# Patient Record
Sex: MALE | Race: WHITE | NOT HISPANIC OR LATINO | URBAN - METROPOLITAN AREA
[De-identification: names, ages, dates, MRNs, and addresses within clinical notes are randomized per-mention and may not be internally consistent; named-entity substitution may affect disease eponyms.]

---

## 2024-01-09 ENCOUNTER — SOCIAL WORK (OUTPATIENT)
Dept: BEHAVIORAL HEALTH | Facility: CLINIC | Age: 29
End: 2024-01-09
Payer: COMMERCIAL

## 2024-01-09 DIAGNOSIS — F41.1 GAD (GENERALIZED ANXIETY DISORDER): ICD-10-CM

## 2024-01-09 PROCEDURE — 90837 PSYTX W PT 60 MINUTES: CPT | Performed by: SOCIAL WORKER

## 2024-01-09 NOTE — PROGRESS NOTES
Televideo Informed Consent for psychological evaluation was reviewed with the patient as follows:     There are potential benefits and risks of the use of telephone or video-conferencing that differ from in-person sessions. Specifically, the telephone or televideo system we are using may not be HIPPA compliant and may present limits to patient confidentiality. Confidentiality still applies for telepsychology services, and nobody will record the session without your permission.    Understanding and verbal agreement was attested to by the patient. Patient identity was confirmed using 3 sources, including telephone number, email address and date of birth. Provision of services via telehealth was necessitated by the restrictions on face-to-face visits accompanying the COVID-19 pandemic.     SECURE NOTE:  This document may not be released or reproduced in any form without the consent of either the Provider, the Provider´s  or the Chair of the Department of Psychiatry. This prohibition includes copying the document into any non-Restricted area of the Ambulatory Electronic Medical Record.      Start time : 1:02 pm  End time : 2:00 pm    Diagnoses : ROBY, CPTSD    Clients current issues: He reports work is going well. He reports his relationship is good. He is happy with Lupe.    Treatment interventions: We reflected on his anxiety that he had about his relationship with his girlfriend when he broke up with her. He says it was rooted in selfishness. He didn't want to ask anyone for permission to do what he wants. A part that fel threatened that he wasn't going to get what he wants. FA moved away when client was age 10. Client was able to mask a lot.  ([place] thing - people shouldn't move away ) I saw him ( my FA ) leave us and follow someone and she disappeared. He laid down his life for people and it never got reciprocated. I developed the selfishness from that. I put up a shield because I was afraid to  get hurt. --- I have a selfish part   Client connects his break up with his selfish part trying to get what he wants - to live by his home town.     On his way home from his MO's house on Gen[place] Day he got a strong feeling of grief and home sickness. - the exile tried to break through again --- He wants to work on this more in our next session -- a huge feeling of guilt and being torn.   He doesn't want to let people down. Am I not being loyal if I don't live in [place].     I sent client information about IFS parts.    Prognosis: good    Treatment plan update: Maintain current goals.      Continue weekly ongoing psychotherapy.

## 2024-01-30 ENCOUNTER — SOCIAL WORK (OUTPATIENT)
Dept: BEHAVIORAL HEALTH | Facility: CLINIC | Age: 29
End: 2024-01-30
Payer: COMMERCIAL

## 2024-01-30 DIAGNOSIS — F41.1 GAD (GENERALIZED ANXIETY DISORDER): ICD-10-CM

## 2024-01-30 PROCEDURE — 90837 PSYTX W PT 60 MINUTES: CPT | Performed by: SOCIAL WORKER

## 2024-01-30 NOTE — PROGRESS NOTES
Televideo Informed Consent for psychological evaluation was reviewed with the patient as follows:     There are potential benefits and risks of the use of telephone or video-conferencing that differ from in-person sessions. Specifically, the telephone or televideo system we are using may not be HIPPA compliant and may present limits to patient confidentiality. Confidentiality still applies for telepsychology services, and nobody will record the session without your permission.    Understanding and verbal agreement was attested to by the patient. Patient identity was confirmed using 3 sources, including telephone number, email address and date of birth. Provision of services via telehealth was necessitated by the restrictions on face-to-face visits accompanying the COVID-19 pandemic.     SECURE NOTE:  This document may not be released or reproduced in any form without the consent of either the Provider, the Provider´s  or the Chair of the Department of Psychiatry. This prohibition includes copying the document into any non-Restricted area of the Ambulatory Electronic Medical Record.      Start time : 1:04 pm  End time : 1:59 pm    Diagnoses : ROBY    Clients current issues: Client has a strong part who wants him to stay connected with [place].     Treatment interventions: IFS - connection with this part  Legacy burden   Heavy heart with a guilty feeling  Its job is to keep him connected to people and his dream childhood.  It afraid I will be all alone with nothing and it will say you will have nothing anymore. ( Fears social isolation) - its my safe zone   -- My GM her friends and family .   [place] --- when I went to [employer] I was all alone - panic and homesick   We ask it if it wants to unburden - it fights back   ( A lot of these feelings came up when I started dating Lupe - I feared she wouldn't want the house or to love in [place]  - she threatened this part )   ( [place] is now also a safe zone  )  We get permission to go to the exile.  20 year old who is sad - he wasn't heard or seen - it knew it didn't want to go far - it went far away and it was so homesick - it had no support - it holds the pain of Lupe and when she moved to [place]   He wants to feel validated. All his peers questioned him and made him feel understood. Client validates the 20 year old.   He wants validation - it was awful  The part says it needs validation from others - I do direct access  The 20 year old says he needs to open up about all this to Lupe and to his MO.  The 28 year old agrees.   The 20 year old say I need to be socially connected and loved.   He agrees to that.     Prognosis: good    Treatment plan update: Decrease anxiety.      Continue weekly ongoing psychotherapy.

## 2024-02-15 ENCOUNTER — SOCIAL WORK (OUTPATIENT)
Dept: BEHAVIORAL HEALTH | Facility: CLINIC | Age: 29
End: 2024-02-15
Payer: COMMERCIAL

## 2024-02-15 DIAGNOSIS — F41.1 GAD (GENERALIZED ANXIETY DISORDER): ICD-10-CM

## 2024-02-15 PROCEDURE — 90837 PSYTX W PT 60 MINUTES: CPT | Performed by: SOCIAL WORKER

## 2024-02-15 NOTE — PROGRESS NOTES
Televideo Informed Consent for psychological evaluation was reviewed with the patient as follows:     There are potential benefits and risks of the use of telephone or video-conferencing that differ from in-person sessions. Specifically, the telephone or televideo system we are using may not be HIPPA compliant and may present limits to patient confidentiality. Confidentiality still applies for telepsychology services, and nobody will record the session without your permission.    Understanding and verbal agreement was attested to by the patient. Patient identity was confirmed using 3 sources, including telephone number, email address and date of birth. Provision of services via telehealth was necessitated by the restrictions on face-to-face visits accompanying the COVID-19 pandemic.     SECURE NOTE:  This document may not be released or reproduced in any form without the consent of either the Provider, the Provider´s  or the Chair of the Department of Psychiatry. This prohibition includes copying the document into any non-Restricted area of the Ambulatory Electronic Medical Record.      Start time : 11:04 am  End time : 12:02 pm    Diagnoses : ROBY    Clients current issues: He is experiencing a major depression. He was sick early in the month - 10 days - He was on antivirals. He feels foggy. He feels no cristina.  Cabin fever is possibly a part of it   He misses being social. He had a plan to have a fun week off but he was sick.     Treatment interventions: CBT, IFS  It is in his head   Its about work - its getting ready for the next chapter of life  I'm always working and not having enough fun.   I don't want to [place].   Theres not the cristina in the job anymore.   Your job was everything. 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   God what if you were just normal.  ( 6 years )   Parts fear - I would feel uncomfortable and I would lose my freedom   The part wants a  more regular schedule.  The part was jealous of the gilr that quit her job.   The part wants me to think long term.  This part wants me to be happy with friends and family.  When your dream job is not your dream job   This part fears giving up the dream and the identity.   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    Prognosis: good    Treatment plan update: Maintain current goals.      Continue weekly ongoing psychotherapy.

## 2024-04-01 ENCOUNTER — APPOINTMENT (OUTPATIENT)
Dept: BEHAVIORAL HEALTH | Facility: CLINIC | Age: 29
End: 2024-04-01
Payer: COMMERCIAL

## 2024-04-29 ENCOUNTER — APPOINTMENT (OUTPATIENT)
Dept: BEHAVIORAL HEALTH | Facility: CLINIC | Age: 29
End: 2024-04-29
Payer: COMMERCIAL

## 2024-05-16 ENCOUNTER — SOCIAL WORK (OUTPATIENT)
Dept: BEHAVIORAL HEALTH | Facility: CLINIC | Age: 29
End: 2024-05-16
Payer: COMMERCIAL

## 2024-05-16 PROCEDURE — 90837 PSYTX W PT 60 MINUTES: CPT | Performed by: SOCIAL WORKER

## 2024-05-16 NOTE — PROGRESS NOTES
"Televideo Informed Consent for psychological evaluation was reviewed with the patient as follows:     There are potential benefits and risks of the use of telephone or video-conferencing that differ from in-person sessions. Specifically, the telephone or televideo system we are using may not be HIPPA compliant and may present limits to patient confidentiality. Confidentiality still applies for telepsychology services, and nobody will record the session without your permission.    Understanding and verbal agreement was attested to by the patient. Patient identity was confirmed using 3 sources, including telephone number, email address and date of birth. Provision of services via telehealth was necessitated by the restrictions on face-to-face visits accompanying the COVID-19 pandemic.     SECURE NOTE:  This document may not be released or reproduced in any form without the consent of either the Provider, the Provider´s  or the Chair of the Department of Psychiatry. This prohibition includes copying the document into any non-Restricted area of the Ambulatory Electronic Medical Record.      Start time : 4:02 pm  End time : 5:00 pm    Diagnoses : ROBY    Clients current issues: His girlfriend is moving into [facility] or [employer] and she is changing jobs.   He had a productive meeting with his supervisor. It refocused him on his work in a positive way. He wants to be promoted to management.   He still loves working in the sports industry.  He is still with Lupe.  \"He wants to be a soccer Dad.    Treatment interventions: CBT  Supported him in operating in [place].   Discussed how to make mature adult decisions.   Discussed perspective taking.     Prognosis:  good    Treatment plan update: Client has made good progress.     Continue weekly ongoing psychotherapy.   "

## 2024-06-04 ENCOUNTER — SOCIAL WORK (OUTPATIENT)
Dept: BEHAVIORAL HEALTH | Facility: CLINIC | Age: 29
End: 2024-06-04
Payer: COMMERCIAL

## 2024-06-04 DIAGNOSIS — F41.1 GAD (GENERALIZED ANXIETY DISORDER): ICD-10-CM

## 2024-06-04 PROCEDURE — 90837 PSYTX W PT 60 MINUTES: CPT | Performed by: SOCIAL WORKER

## 2024-06-04 NOTE — PROGRESS NOTES
"Televideo Informed Consent for psychological evaluation was reviewed with the patient as follows:     There are potential benefits and risks of the use of telephone or video-conferencing that differ from in-person sessions. Specifically, the telephone or televideo system we are using may not be HIPPA compliant and may present limits to patient confidentiality. Confidentiality still applies for telepsychology services, and nobody will record the session without your permission.    Understanding and verbal agreement was attested to by the patient. Patient identity was confirmed using 3 sources, including telephone number, email address and date of birth. Provision of services via telehealth was necessitated by the restrictions on face-to-face visits accompanying the COVID-19 pandemic.     SECURE NOTE:  This document may not be released or reproduced in any form without the consent of either the Provider, the Provider´s  or the Chair of the Department of Psychiatry. This prohibition includes copying the document into any non-Restricted area of the Ambulatory Electronic Medical Record.      Start time : 2:05 pm  End time :  3:00 pm    Diagnoses : ROBY     Clients current issues: Client shared a memory he has of his FA moving to [place] and how this impacted him.     Treatment interventions: IFS  FA broke his trust when he moved away  It doesn't make sense to me  FA followed women he loved - he kept getting screwed over   It has made me protective of what I love/want  FA unselfish/ codependent / me selfish   Part - worry -  feels like something is out to get me   Part - that struggles with trusting anything different - don't rock the boat   Are we not worth it - to move back to be with us - who are you choosing  Part that believes we don't matter   ---- shame   He wants to be a suburban Dad  \"Its okay to be a homebody.\"  He wants to be near [place]. He gets a sense of belonging there.     Prognosis: " good    Treatment plan update: Client is working on his goals.      Continue weekly ongoing psychotherapy.

## 2024-07-09 ENCOUNTER — APPOINTMENT (OUTPATIENT)
Dept: BEHAVIORAL HEALTH | Facility: CLINIC | Age: 29
End: 2024-07-09
Payer: COMMERCIAL

## 2024-07-09 DIAGNOSIS — F41.1 GAD (GENERALIZED ANXIETY DISORDER): ICD-10-CM

## 2024-07-09 PROCEDURE — 90837 PSYTX W PT 60 MINUTES: CPT | Performed by: SOCIAL WORKER

## 2024-07-09 NOTE — PROGRESS NOTES
"Televideo Informed Consent for psychological evaluation was reviewed with the patient as follows:     There are potential benefits and risks of the use of telephone or video-conferencing that differ from in-person sessions. Specifically, the telephone or televideo system we are using may not be HIPPA compliant and may present limits to patient confidentiality. Confidentiality still applies for telepsychology services, and nobody will record the session without your permission.    Understanding and verbal agreement was attested to by the patient. Patient identity was confirmed using 3 sources, including telephone number, email address and date of birth.      SECURE NOTE:  This document may not be released or reproduced in any form without the consent of either the Provider, the Provider´s  or the Chair of the Department of Psychiatry. This prohibition includes copying the document into any non-Restricted area of the Ambulatory Electronic Medical Record.      Start time : 4:02 pm  End time : 4:55 pm    Diagnoses : ROBY    Clients current issues: He is getting ready for a change at work. He is confused about work. Is this it? He has been exploring other jobs. He did not get the promotion at work which started his thinking about other options. He works most weekends. 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 He doesn't want this in his future. \" He has a fear of the break up.\"    Treatment interventions: IFS  Supported his exploration of other employment.   He sees Lupe as the next chapter.   Addressed clients fears.     Prognosis: good    Treatment plan update: Client is invested in therapy.      Continue weekly ongoing psychotherapy.   "

## 2024-08-22 ENCOUNTER — APPOINTMENT (OUTPATIENT)
Dept: BEHAVIORAL HEALTH | Facility: CLINIC | Age: 29
End: 2024-08-22
Payer: COMMERCIAL

## 2024-08-22 DIAGNOSIS — F41.1 GAD (GENERALIZED ANXIETY DISORDER): ICD-10-CM

## 2024-08-22 PROCEDURE — 90837 PSYTX W PT 60 MINUTES: CPT | Performed by: SOCIAL WORKER
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
## 2024-09-19 ENCOUNTER — APPOINTMENT (OUTPATIENT)
Dept: BEHAVIORAL HEALTH | Facility: CLINIC | Age: 29
End: 2024-09-19
Payer: COMMERCIAL

## 2024-09-19 DIAGNOSIS — F41.1 GAD (GENERALIZED ANXIETY DISORDER): ICD-10-CM

## 2024-09-19 PROCEDURE — 90837 PSYTX W PT 60 MINUTES: CPT | Performed by: SOCIAL WORKER

## 2024-09-19 NOTE — PROGRESS NOTES
"Televideo Informed Consent for psychological evaluation was reviewed with the patient as follows:     There are potential benefits and risks of the use of telephone or video-conferencing that differ from in-person sessions. Specifically, the telephone or televideo system we are using may not be HIPPA compliant and may present limits to patient confidentiality. Confidentiality still applies for telepsychology services, and nobody will record the session without your permission.    Understanding and verbal agreement was attested to by the patient. Patient identity was confirmed using 3 sources, including telephone number, email address and date of birth. Provision of services via telehealth was necessitated by the restrictions on face-to-face visits accompanying the COVID-19 pandemic.     SECURE NOTE:  This document may not be released or reproduced in any form without the consent of either the Provider, the Provider´s  or the Chair of the Department of Psychiatry. This prohibition includes copying the document into any non-Restricted area of the Ambulatory Electronic Medical Record.      Start time : 2:02 pm  End time : 2:59 pm    Diagnoses : ROBY    Clients current issues: He had an interview at a new job but he doesn't want it. He wants a 9-5 and this job he interviewed for does not have this. \" I want to be the Suburban Dad.\"  He is getting more involved with the [employer] and less with the [employer].  He recognizes he was in a slump/depressed for a while - some dread.     Treatment interventions: IFS Explored his parts related to this job opportunity.   Explored his recent depression and its causes. - expectations for his career that he thought he wasn't meeting  Learning to accept life as it is.     Prognosis: good    Treatment plan update: Client is invested in the therapy process.      Continue weekly ongoing psychotherapy.   " none

## 2024-10-02 ENCOUNTER — APPOINTMENT (OUTPATIENT)
Dept: PRIMARY CARE | Facility: CLINIC | Age: 29
End: 2024-10-02
Payer: COMMERCIAL

## 2024-10-02 VITALS
OXYGEN SATURATION: 98 % | HEART RATE: 88 BPM | DIASTOLIC BLOOD PRESSURE: 68 MMHG | SYSTOLIC BLOOD PRESSURE: 122 MMHG | WEIGHT: 195 LBS | RESPIRATION RATE: 14 BRPM | HEIGHT: 73 IN | TEMPERATURE: 97.7 F | BODY MASS INDEX: 25.84 KG/M2

## 2024-10-02 DIAGNOSIS — Z11.4 ENCOUNTER FOR SCREENING FOR HIV: ICD-10-CM

## 2024-10-02 DIAGNOSIS — Z23 IMMUNIZATION DUE: ICD-10-CM

## 2024-10-02 DIAGNOSIS — T24.131A SUPERFICIAL BURN OF RIGHT LOWER LEG, INITIAL ENCOUNTER: ICD-10-CM

## 2024-10-02 DIAGNOSIS — Z00.00 WELL ADULT HEALTH CHECK: Primary | ICD-10-CM

## 2024-10-02 DIAGNOSIS — B00.9 HERPES SIMPLEX: ICD-10-CM

## 2024-10-02 PROBLEM — F41.1 GAD (GENERALIZED ANXIETY DISORDER): Status: RESOLVED | Noted: 2024-06-04 | Resolved: 2024-10-02

## 2024-10-02 PROBLEM — L05.01 PILONIDAL CYST WITH ABSCESS: Status: RESOLVED | Noted: 2017-09-25 | Resolved: 2024-10-02

## 2024-10-02 PROBLEM — L05.01 PILONIDAL CYST WITH ABSCESS: Status: ACTIVE | Noted: 2017-09-25

## 2024-10-02 PROCEDURE — 3008F BODY MASS INDEX DOCD: CPT | Performed by: INTERNAL MEDICINE

## 2024-10-02 PROCEDURE — 1036F TOBACCO NON-USER: CPT | Performed by: INTERNAL MEDICINE

## 2024-10-02 PROCEDURE — 90656 IIV3 VACC NO PRSV 0.5 ML IM: CPT | Performed by: INTERNAL MEDICINE

## 2024-10-02 PROCEDURE — 90471 IMMUNIZATION ADMIN: CPT | Performed by: INTERNAL MEDICINE

## 2024-10-02 PROCEDURE — 99385 PREV VISIT NEW AGE 18-39: CPT | Performed by: INTERNAL MEDICINE

## 2024-10-02 PROCEDURE — 90715 TDAP VACCINE 7 YRS/> IM: CPT | Performed by: INTERNAL MEDICINE

## 2024-10-02 PROCEDURE — 90472 IMMUNIZATION ADMIN EACH ADD: CPT | Performed by: INTERNAL MEDICINE

## 2024-10-02 ASSESSMENT — ENCOUNTER SYMPTOMS
POLYDIPSIA: 0
FEVER: 0
NECK PAIN: 0
VOMITING: 0
DIZZINESS: 0
DIARRHEA: 0
COUGH: 0
ARTHRALGIAS: 0
DYSPHORIC MOOD: 0
HEADACHES: 0
PALPITATIONS: 0
NAUSEA: 0
CHEST TIGHTNESS: 0
CONSTIPATION: 0
FREQUENCY: 0
SHORTNESS OF BREATH: 0
UNEXPECTED WEIGHT CHANGE: 0

## 2024-10-02 ASSESSMENT — PATIENT HEALTH QUESTIONNAIRE - PHQ9
2. FEELING DOWN, DEPRESSED OR HOPELESS: NOT AT ALL
1. LITTLE INTEREST OR PLEASURE IN DOING THINGS: NOT AT ALL
SUM OF ALL RESPONSES TO PHQ9 QUESTIONS 1 AND 2: 0
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
## 2024-10-24 ENCOUNTER — APPOINTMENT (OUTPATIENT)
Dept: BEHAVIORAL HEALTH | Facility: CLINIC | Age: 29
End: 2024-10-24
Payer: COMMERCIAL

## 2024-10-24 DIAGNOSIS — F41.1 GAD (GENERALIZED ANXIETY DISORDER): ICD-10-CM

## 2024-10-24 PROCEDURE — 90837 PSYTX W PT 60 MINUTES: CPT | Performed by: SOCIAL WORKER

## 2024-10-24 NOTE — PROGRESS NOTES
Televideo Informed Consent for psychological evaluation was reviewed with the patient as follows:     There are potential benefits and risks of the use of telephone or video-conferencing that differ from in-person sessions. Specifically, the telephone or televideo system we are using may not be HIPPA compliant and may present limits to patient confidentiality. Confidentiality still applies for telepsychology services, and nobody will record the session without your permission.    Understanding and verbal agreement was attested to by the patient. Patient identity was confirmed using 3 sources, including telephone number, email address and date of birth. Provision of services via telehealth was necessitated by the restrictions on face-to-face visits accompanying the COVID-19 pandemic.     SECURE NOTE:  This document may not be released or reproduced in any form without the consent of either the Provider, the Provider´s  or the Chair of the Department of Psychiatry. This prohibition includes copying the document into any non-Restricted area of the Ambulatory Electronic Medical Record.      Start time : 2:02 pm  End time : 2:58 pm    Diagnoses : ROBY    Clients current issues: He feels like something is wrong inside himself.   He is losing enjoyment in life. He questions whether he has depression.     Treatment interventions: IFS, Explored this part. Is this my body saying I need something different. Am I on the right path to living the life I want to live? This part doesn't know what is next. I don't have a challenge. He feels stagnant.   He likes to work hard play hard but he is not getting this. -- not enough play  ? 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 Leaving sports   If the part stopped - I could feel regret    ---------- This part may want to work in his home town again.  This part wants him to find more purpose.   Could I work in college life?    Supported more exploring what is fun and what could be a challenging adventurous career.    Prognosis: good    Treatment plan update: Client is doing well.      Continue weekly ongoing psychotherapy.

## 2024-11-21 ENCOUNTER — APPOINTMENT (OUTPATIENT)
Dept: BEHAVIORAL HEALTH | Facility: CLINIC | Age: 29
End: 2024-11-21
Payer: COMMERCIAL

## 2024-11-21 DIAGNOSIS — F41.1 GAD (GENERALIZED ANXIETY DISORDER): ICD-10-CM

## 2024-11-21 PROCEDURE — 90837 PSYTX W PT 60 MINUTES: CPT | Performed by: SOCIAL WORKER

## 2024-11-21 NOTE — PROGRESS NOTES
Televideo Informed Consent for psychological evaluation was reviewed with the patient as follows:     There are potential benefits and risks of the use of telephone or video-conferencing that differ from in-person sessions. Specifically, the telephone or televideo system we are using may not be HIPPA compliant and may present limits to patient confidentiality. Confidentiality still applies for telepsychology services, and nobody will record the session without your permission.    Understanding and verbal agreement was attested to by the patient. Patient identity was confirmed using 3 sources, including telephone number, email address and date of birth. Provision of services via telehealth was necessitated by the restrictions on face-to-face visits accompanying the COVID-19 pandemic.     SECURE NOTE:  This document may not be released or reproduced in any form without the consent of either the Provider, the Provider´s  or the Chair of the Department of Psychiatry. This prohibition includes copying the document into any non-Restricted area of the Ambulatory Electronic Medical Record.      Start time : 4:00 pm  End time : 5:00 pm    Diagnoses : ROBY    Clients current issues: Client is happier. He is getting more time off.   He is considering his future with Lupe more. They are discussing these things and compromising.  -- will they have kids  -- will they live in [place]  Lupe is moving in with him in the summer.    Treatment interventions:  Supported clients self evaluation and mature conversations with his girlfriend.  Discussed how he pulled himself out of depression with reflection, conversation and perspective.   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    Prognosis: good    Treatment plan update: Client is fully invested in therapy.      Continue weekly ongoing psychotherapy.

## 2024-12-30 ENCOUNTER — APPOINTMENT (OUTPATIENT)
Dept: BEHAVIORAL HEALTH | Facility: CLINIC | Age: 29
End: 2024-12-30
Payer: COMMERCIAL

## 2025-01-09 ENCOUNTER — APPOINTMENT (OUTPATIENT)
Dept: BEHAVIORAL HEALTH | Facility: CLINIC | Age: 30
End: 2025-01-09
Payer: COMMERCIAL

## 2025-01-09 DIAGNOSIS — F41.1 GAD (GENERALIZED ANXIETY DISORDER): ICD-10-CM

## 2025-01-09 PROCEDURE — 90837 PSYTX W PT 60 MINUTES: CPT | Performed by: SOCIAL WORKER

## 2025-01-09 NOTE — PROGRESS NOTES
Televideo Informed Consent for psychological evaluation was reviewed with the patient as follows:     There are potential benefits and risks of the use of telephone or video-conferencing that differ from in-person sessions. Specifically, the telephone or televideo system we are using may not be HIPPA compliant and may present limits to patient confidentiality. Confidentiality still applies for telepsychology services, and nobody will record the session without your permission.    Understanding and verbal agreement was attested to by the patient. Patient identity was confirmed using 3 sources, including telephone number, email address and date of birth. Provision of services via telehealth was necessitated by the restrictions on face-to-face visits accompanying the COVID-19 pandemic.     SECURE NOTE:  This document may not be released or reproduced in any form without the consent of either the Provider, the Provider´s  or the Chair of the Department of Psychiatry. This prohibition includes copying the document into any non-Restricted area of the Ambulatory Electronic Medical Record.      Start time : 1:03 pm  End time : 2:00 pm    Diagnoses : ROBY    Clients current issues: He had some anxiety and depression over the holidays.   Family , hometown, relationship, place of living job - He has rigid thoughts about these things that he wants to explore.   Lupe doesn't want a child. He is open to not having a child.   He stopped drinking and being on social media.  He says he has some baked in beliefs.   He is rethinking living in [place] in the future.    Treatment interventions: Discussed how he has had long held strong beliefs. Discussed his ability to be mature in his ability to compromise.   Discussed his rigid thinking and how to be more open. We worked on an openness to change.    Prognosis: good    Treatment plan update: Client is invested in therapy.     Continue weekly ongoing psychotherapy.

## 2025-01-23 ENCOUNTER — LAB (OUTPATIENT)
Dept: LAB | Facility: LAB | Age: 30
End: 2025-01-23
Payer: COMMERCIAL

## 2025-01-23 DIAGNOSIS — Z11.4 ENCOUNTER FOR SCREENING FOR HIV: ICD-10-CM

## 2025-01-23 DIAGNOSIS — B00.9 HERPES SIMPLEX: ICD-10-CM

## 2025-01-23 DIAGNOSIS — Z00.00 WELL ADULT HEALTH CHECK: ICD-10-CM

## 2025-01-23 LAB
ERYTHROCYTE [DISTWIDTH] IN BLOOD BY AUTOMATED COUNT: 11.9 % (ref 11.5–14.5)
HCT VFR BLD AUTO: 47.9 % (ref 41–52)
HGB BLD-MCNC: 16.6 G/DL (ref 13.5–17.5)
HIV 1+2 AB+HIV1 P24 AG SERPL QL IA: NONREACTIVE
MCH RBC QN AUTO: 28.7 PG (ref 26–34)
MCHC RBC AUTO-ENTMCNC: 34.7 G/DL (ref 32–36)
MCV RBC AUTO: 83 FL (ref 80–100)
NRBC BLD-RTO: 0 /100 WBCS (ref 0–0)
PLATELET # BLD AUTO: 251 X10*3/UL (ref 150–450)
RBC # BLD AUTO: 5.78 X10*6/UL (ref 4.5–5.9)
WBC # BLD AUTO: 5 X10*3/UL (ref 4.4–11.3)

## 2025-01-23 PROCEDURE — 84443 ASSAY THYROID STIM HORMONE: CPT

## 2025-01-23 PROCEDURE — 86695 HERPES SIMPLEX TYPE 1 TEST: CPT

## 2025-01-23 PROCEDURE — 84439 ASSAY OF FREE THYROXINE: CPT

## 2025-01-23 PROCEDURE — 86696 HERPES SIMPLEX TYPE 2 TEST: CPT

## 2025-01-23 PROCEDURE — 80053 COMPREHEN METABOLIC PANEL: CPT

## 2025-01-23 PROCEDURE — 80061 LIPID PANEL: CPT

## 2025-01-23 PROCEDURE — 87389 HIV-1 AG W/HIV-1&-2 AB AG IA: CPT

## 2025-01-23 PROCEDURE — 85027 COMPLETE CBC AUTOMATED: CPT

## 2025-01-24 LAB
ALBUMIN SERPL BCP-MCNC: 5.1 G/DL (ref 3.4–5)
ALP SERPL-CCNC: 55 U/L (ref 33–120)
ALT SERPL W P-5'-P-CCNC: 18 U/L (ref 10–52)
ANION GAP SERPL CALC-SCNC: 11 MMOL/L (ref 10–20)
AST SERPL W P-5'-P-CCNC: 17 U/L (ref 9–39)
BILIRUB SERPL-MCNC: 0.9 MG/DL (ref 0–1.2)
BUN SERPL-MCNC: 20 MG/DL (ref 6–23)
CALCIUM SERPL-MCNC: 10.1 MG/DL (ref 8.6–10.6)
CHLORIDE SERPL-SCNC: 102 MMOL/L (ref 98–107)
CHOLEST SERPL-MCNC: 189 MG/DL (ref 0–199)
CHOLESTEROL/HDL RATIO: 4.7
CO2 SERPL-SCNC: 31 MMOL/L (ref 21–32)
CREAT SERPL-MCNC: 1.28 MG/DL (ref 0.5–1.3)
EGFRCR SERPLBLD CKD-EPI 2021: 79 ML/MIN/1.73M*2
GLUCOSE SERPL-MCNC: 80 MG/DL (ref 74–99)
HDLC SERPL-MCNC: 40.4 MG/DL
HERPES SIMPLEX VIRUS 1 IGG: >8 INDEX
HERPES SIMPLEX VIRUS 2 IGG: <0.2 INDEX
LDLC SERPL CALC-MCNC: 132 MG/DL
NON HDL CHOLESTEROL: 149 MG/DL (ref 0–149)
POTASSIUM SERPL-SCNC: 4.3 MMOL/L (ref 3.5–5.3)
PROT SERPL-MCNC: 7.1 G/DL (ref 6.4–8.2)
SODIUM SERPL-SCNC: 140 MMOL/L (ref 136–145)
T4 FREE SERPL-MCNC: 1.41 NG/DL (ref 0.78–1.48)
TRIGL SERPL-MCNC: 83 MG/DL (ref 0–149)
TSH SERPL-ACNC: 19.65 MIU/L (ref 0.44–3.98)
VLDL: 17 MG/DL (ref 0–40)

## 2025-01-27 ENCOUNTER — OFFICE VISIT (OUTPATIENT)
Dept: PRIMARY CARE | Facility: CLINIC | Age: 30
End: 2025-01-27
Payer: COMMERCIAL

## 2025-01-27 ENCOUNTER — APPOINTMENT (OUTPATIENT)
Dept: BEHAVIORAL HEALTH | Facility: CLINIC | Age: 30
End: 2025-01-27
Payer: COMMERCIAL

## 2025-01-27 VITALS
TEMPERATURE: 98.6 F | OXYGEN SATURATION: 97 % | DIASTOLIC BLOOD PRESSURE: 72 MMHG | WEIGHT: 190.6 LBS | HEIGHT: 73 IN | SYSTOLIC BLOOD PRESSURE: 112 MMHG | RESPIRATION RATE: 16 BRPM | HEART RATE: 60 BPM | BODY MASS INDEX: 25.26 KG/M2

## 2025-01-27 DIAGNOSIS — R94.6 ABNORMAL THYROID FUNCTION TEST: Primary | ICD-10-CM

## 2025-01-27 DIAGNOSIS — F41.1 GAD (GENERALIZED ANXIETY DISORDER): ICD-10-CM

## 2025-01-27 PROCEDURE — 99213 OFFICE O/P EST LOW 20 MIN: CPT | Performed by: INTERNAL MEDICINE

## 2025-01-27 PROCEDURE — 1036F TOBACCO NON-USER: CPT | Performed by: INTERNAL MEDICINE

## 2025-01-27 PROCEDURE — 3008F BODY MASS INDEX DOCD: CPT | Performed by: INTERNAL MEDICINE

## 2025-01-27 PROCEDURE — 90837 PSYTX W PT 60 MINUTES: CPT | Performed by: SOCIAL WORKER

## 2025-01-27 ASSESSMENT — ENCOUNTER SYMPTOMS
WEIGHT LOSS: 1
FATIGUE: 1
DRY SKIN: 1
DEPRESSED MOOD: 1
HAIR LOSS: 1

## 2025-01-27 ASSESSMENT — PATIENT HEALTH QUESTIONNAIRE - PHQ9
SUM OF ALL RESPONSES TO PHQ9 QUESTIONS 1 AND 2: 2
2. FEELING DOWN, DEPRESSED OR HOPELESS: SEVERAL DAYS
10. IF YOU CHECKED OFF ANY PROBLEMS, HOW DIFFICULT HAVE THESE PROBLEMS MADE IT FOR YOU TO DO YOUR WORK, TAKE CARE OF THINGS AT HOME, OR GET ALONG WITH OTHER PEOPLE: SOMEWHAT DIFFICULT
1. LITTLE INTEREST OR PLEASURE IN DOING THINGS: SEVERAL DAYS

## 2025-01-27 ASSESSMENT — PAIN SCALES - GENERAL: PAINLEVEL_OUTOF10: 0-NO PAIN

## 2025-01-27 NOTE — PROGRESS NOTES
"Subjective   Minh Mccord is a 29 y.o. male who presents for Follow-up (Test results ).      Patient would like to discuss thyroid and possible hypothyroidism   Patient states that he has noticed mood changes and hair loss, states that family does have history of thyroid and thinning hair so unsure if its related to his thyroid or family  Patient states that there is a family history of thyroid issues   Patient has lost 5 pounds in the last 4 months     Thyroid Problem  Presents for initial visit. Symptoms include depressed mood, dry skin, fatigue, hair loss and weight loss. The symptoms have been worsening. Past treatments include nothing. Risk factors include family history of hypothyroidism.       Review of Systems   Constitutional:  Positive for fatigue and weight loss.   All other systems reviewed and are negative.      Objective   /72   Pulse 60   Temp 37 °C (98.6 °F)   Resp 16   Ht 1.854 m (6' 1\")   Wt 86.5 kg (190 lb 9.6 oz)   SpO2 97%   BMI 25.15 kg/m²       Physical Exam  Constitutional:       Appearance: Normal appearance.   HENT:      Head: Normocephalic.   Eyes:      Conjunctiva/sclera: Conjunctivae normal.   Neck:      Thyroid: Thyromegaly present. No thyroid mass or thyroid tenderness.      Comments: Mild thyromegaly  Cardiovascular:      Rate and Rhythm: Normal rate and regular rhythm.      Heart sounds: Normal heart sounds.   Pulmonary:      Effort: Pulmonary effort is normal.      Breath sounds: Normal breath sounds.   Abdominal:      General: Abdomen is flat.      Palpations: Abdomen is soft.   Musculoskeletal:      Cervical back: Neck supple.   Skin:     General: Skin is warm and dry.   Neurological:      Mental Status: He is alert.         Assessment & Plan  Abnormal thyroid function test  Has generous thyroid on exam today bu no palpable masses or tenderness.  Will check other hormone levels and ask endocrinology via chat but may ask for full consult.    Orders:    Thyroglobulin " and Antithyroglobulin; Future    C-reactive protein; Future    Prolactin level; Future    Gonadotropin Releasing Hormone; Future    Testosterone, total and free; Future    T3, free; Future    Follow up in 3 months or sooner should symptoms worsen.         Brodie Lawton, DO

## 2025-02-06 ENCOUNTER — APPOINTMENT (OUTPATIENT)
Dept: BEHAVIORAL HEALTH | Facility: CLINIC | Age: 30
End: 2025-02-06
Payer: COMMERCIAL

## 2025-02-06 DIAGNOSIS — F41.1 GAD (GENERALIZED ANXIETY DISORDER): ICD-10-CM

## 2025-02-06 PROCEDURE — 90837 PSYTX W PT 60 MINUTES: CPT | Performed by: SOCIAL WORKER

## 2025-02-06 NOTE — PROGRESS NOTES
Televideo Informed Consent for psychological evaluation was reviewed with the patient as follows:     There are potential benefits and risks of the use of telephone or video-conferencing that differ from in-person sessions. Specifically, the telephone or televideo system we are using may not be HIPPA compliant and may present limits to patient confidentiality. Confidentiality still applies for telepsychology services, and nobody will record the session without your permission.    Understanding and verbal agreement was attested to by the patient. Patient identity was confirmed using 3 sources, including telephone number, email address and date of birth. Provision of services via telehealth was necessitated by the restrictions on face-to-face visits accompanying the COVID-19 pandemic.     SECURE NOTE:  This document may not be released or reproduced in any form without the consent of either the Provider, the Provider´s  or the Chair of the Department of Psychiatry. This prohibition includes copying the document into any non-Restricted area of the Ambulatory Electronic Medical Record.      Start time : 3:00 pm  End time : 4:00 pm    Diagnoses : ROBY    Clients current issues: He has detached some from his best friend.   Client has been working on behaving in mature ways. He is rejecting learned helplessness.     Treatment interventions: Discussed the changing nature of friendships.  Helped client prioritize what he wants to put his time and energy into.  He rejects learned helplessness in others. Supported him in healthy communication skills.  Discussed clients loyalty as a strength and a weakness. Supported mature operating.    He needs some more work on finding a sense of purpose. He needs help figuring out what's next. - He doesn't think its sports management anymore. - hobbies, work -- Supported more research into what he enjoys and wants    Prognosis:  good    Treatment plan update: Client is  maturing and growing.      Continue weekly ongoing psychotherapy.

## 2025-02-13 ENCOUNTER — APPOINTMENT (OUTPATIENT)
Dept: BEHAVIORAL HEALTH | Facility: CLINIC | Age: 30
End: 2025-02-13
Payer: COMMERCIAL

## 2025-02-13 DIAGNOSIS — F41.1 GAD (GENERALIZED ANXIETY DISORDER): ICD-10-CM

## 2025-02-13 PROCEDURE — 90837 PSYTX W PT 60 MINUTES: CPT | Performed by: SOCIAL WORKER

## 2025-02-13 NOTE — PROGRESS NOTES
Televideo Informed Consent for psychological evaluation was reviewed with the patient as follows:     There are potential benefits and risks of the use of telephone or video-conferencing that differ from in-person sessions. Specifically, the telephone or televideo system we are using may not be HIPPA compliant and may present limits to patient confidentiality. Confidentiality still applies for telepsychology services, and nobody will record the session without your permission.    Understanding and verbal agreement was attested to by the patient. Patient identity was confirmed using 3 sources, including telephone number, email address and date of birth. Provision of services via telehealth was necessitated by the restrictions on face-to-face visits accompanying the COVID-19 pandemic.     SECURE NOTE:  This document may not be released or reproduced in any form without the consent of either the Provider, the Provider´s  or the Chair of the Department of Psychiatry. This prohibition includes copying the document into any non-Restricted area of the Ambulatory Electronic Medical Record.      Start time : 2:02 pm  End time : 3:00 pm    Diagnoses : ROBY    Clients current issues: He has a part that doubts and is uncertain. He wants a change but is unsure what he wants.     Treatment interventions: IFS  He has a need to have more of a purpose.    He doesn't like her job schedule.  He wants more connection. ( similar schedules)     Suggested discussion with Lupe.    Prognosis: good    Treatment plan update: Client is working on his goals.      Continue weekly ongoing psychotherapy.

## 2025-02-20 ENCOUNTER — APPOINTMENT (OUTPATIENT)
Dept: BEHAVIORAL HEALTH | Facility: CLINIC | Age: 30
End: 2025-02-20
Payer: COMMERCIAL

## 2025-02-20 DIAGNOSIS — F41.1 GAD (GENERALIZED ANXIETY DISORDER): ICD-10-CM

## 2025-02-20 PROBLEM — F32.1 CURRENT MODERATE EPISODE OF MAJOR DEPRESSIVE DISORDER WITHOUT PRIOR EPISODE (MULTI): Status: ACTIVE | Noted: 2025-02-20

## 2025-02-20 PROCEDURE — 90837 PSYTX W PT 60 MINUTES: CPT | Performed by: SOCIAL WORKER

## 2025-02-20 NOTE — PROGRESS NOTES
"Televideo Informed Consent for psychological evaluation was reviewed with the patient as follows:     There are potential benefits and risks of the use of telephone or video-conferencing that differ from in-person sessions. Specifically, the telephone or televideo system we are using may not be HIPPA compliant and may present limits to patient confidentiality. Confidentiality still applies for telepsychology services, and nobody will record the session without your permission.    Understanding and verbal agreement was attested to by the patient. Patient identity was confirmed using 3 sources, including telephone number, email address and date of birth. Provision of services via telehealth was necessitated by the restrictions on face-to-face visits accompanying the COVID-19 pandemic.     SECURE NOTE:  This document may not be released or reproduced in any form without the consent of either the Provider, the Provider´s  or the Chair of the Department of Psychiatry. This prohibition includes copying the document into any non-Restricted area of the Ambulatory Electronic Medical Record.      Start time : 3:03 pm  End time : 4:00 pm    Diagnoses : MDD, ROBY    Clients current issues: Client reports he is at his absolute lowest. He reports depression.  Lupe said she isn't sure she wants to move in with him. Then she said she doesn't want to move into [place].  He doesn't know what his purpose is.   \"I'm having a crisis.\" -not eating or sleeping  He reports feeling anguish, distrust, fear, frustration and sadness.     Treatment interventions: Suggested he needs time to sort this out. Suggested he has trauma from his FA's move that harmed him.   Explored there could be a compromise that works for both of them.  Suggested they may have different goals for the future.    Prognosis: good    Treatment plan update: Client is invested in his therapy goals.      Continue weekly ongoing psychotherapy.   "

## 2025-02-25 ENCOUNTER — APPOINTMENT (OUTPATIENT)
Dept: BEHAVIORAL HEALTH | Facility: CLINIC | Age: 30
End: 2025-02-25
Payer: COMMERCIAL

## 2025-02-25 DIAGNOSIS — F32.1 CURRENT MODERATE EPISODE OF MAJOR DEPRESSIVE DISORDER WITHOUT PRIOR EPISODE (MULTI): ICD-10-CM

## 2025-02-25 DIAGNOSIS — F41.1 GAD (GENERALIZED ANXIETY DISORDER): ICD-10-CM

## 2025-02-25 PROCEDURE — 90837 PSYTX W PT 60 MINUTES: CPT | Performed by: SOCIAL WORKER

## 2025-02-25 NOTE — PROGRESS NOTES
Televideo Informed Consent for psychological evaluation was reviewed with the patient as follows:     There are potential benefits and risks of the use of telephone or video-conferencing that differ from in-person sessions. Specifically, the telephone or televideo system we are using may not be HIPPA compliant and may present limits to patient confidentiality. Confidentiality still applies for telepsychology services, and nobody will record the session without your permission.    Understanding and verbal agreement was attested to by the patient. Patient identity was confirmed using 3 sources, including telephone number, email address and date of birth. Provision of services via telehealth was necessitated by the restrictions on face-to-face visits accompanying the COVID-19 pandemic.     SECURE NOTE:  This document may not be released or reproduced in any form without the consent of either the Provider, the Provider´s  or the Chair of the Department of Psychiatry. This prohibition includes copying the document into any non-Restricted area of the Ambulatory Electronic Medical Record.      Start time : 9:04 am  End time : 10:00 am    Diagnoses : ROBY    Clients current issues: Client has found out he has a thyroid issue. This could be causing his depression and some other physical symptoms.     He and Lupe met and talked. He reports she is defensive. He wants to take some time before moving in with her.    Treatment interventions: Discussed ways he can work on being more flexible. Discussed his thinking related to his relationship. Explored the dynamics of fighting for power and control in the relationship.     Prognosis: good    Treatment plan update: Client is invested in therapy and working on his goals.      Continue weekly ongoing psychotherapy.   
ATTENDING STATEMENT:    Hospital length of stay: 3d  I have read and agree with this Progress Note. I have personally seen and examined this patient. I have fully participated in the care of this patient. I have reviewed all pertinent clinical information, including history, physical exam, plan, and the Resident’s note and agree except as noted.    Vital Signs Last 24 Hrs  T(C): 36.8 (16 Feb 2024 10:15), Max: 37.4 (15 Feb 2024 20:00)  T(F): 98.2 (16 Feb 2024 10:15), Max: 99.3 (15 Feb 2024 20:00)  HR: 108 (16 Feb 2024 10:15) (99 - 114)  BP: 102/71 (16 Feb 2024 10:15) (91/54 - 110/51)  BP(mean): 81 (16 Feb 2024 10:15) (65 - 81)  RR: 18 (16 Feb 2024 10:15) (14 - 19)  SpO2: 99% (16 Feb 2024 10:15) (98% - 100%)    Parameters below as of 16 Feb 2024 05:33  Patient On (Oxygen Delivery Method): room air      Gen: no apparent distress, appears comfortable rates pain as 4/10  HEENT: normocephalic/atraumatic, moist mucous membranes, throat clear, pupils equal round and reactive, extraocular movements intact, clear conjunctiva  Neck: supple  Heart: S1S2+, regular rate and rhythm, no murmur, cap refill < 2 sec, 2+ peripheral pulses  Lungs: normal respiratory pattern, clear to auscultation bilaterally  Abd: soft, nontender, nondistended, bowel sounds present, no hepatosplenomegaly  : deferred  Ext: no swelling/pain of lower extremities, LAINE and hemovac in place  Neuro: no focal deficits, awake, alert, no acute change from baseline exam  Skin: no rash, intact and not indurated    A/P: TREVOR VILLALTA is a 14yFemale with PMHx scoliosis now POD #3 s/p T3-L2 posterior spinal fusion with rib resection. Pt doing well on rapid recovery protocol. No stool since surgery but abdomen soft, nondistended, nontender- would increase Bowel regimen.  Plan:  Pain control per pain team- well controlled  Regular diet  Increase senna to twice daily, add suppository, if no stool consider miralax as well  VTE: SCDs bilaterally, encourage OOB  PT/OT  Rest of care per primary team      Anticipated Discharge Date:  [ ] Social Work needs:  [ ] Case management needs:  [ ] Other discharge needs:    Rajani Sanders MD  Pediatric Hospitalist

## 2025-02-26 LAB — THYROPEROXIDASE AB SERPL-ACNC: 191 IU/ML

## 2025-02-28 DIAGNOSIS — E06.3 HASHIMOTO'S DISEASE: Primary | ICD-10-CM

## 2025-02-28 LAB
CRP SERPL-MCNC: <3 MG/L
GNRH SERPL-MCNC: <2 PG/ML (ref 0–10)
PROLACTIN SERPL-MCNC: 5.5 NG/ML (ref 2–18)
T3FREE SERPL-MCNC: 3.5 PG/ML (ref 2.3–4.2)
TESTOST FREE SERPL-MCNC: NORMAL PG/ML
TESTOST SERPL-MCNC: NORMAL NG/DL
THYROGLOB AB SERPL-ACNC: >1000 IU/ML
THYROGLOB SERPL-MCNC: ABNORMAL NG/DL

## 2025-02-28 RX ORDER — LEVOTHYROXINE SODIUM 50 UG/1
50 TABLET ORAL DAILY
Qty: 90 TABLET | Refills: 0 | Status: SHIPPED | OUTPATIENT
Start: 2025-02-28 | End: 2025-05-29

## 2025-03-06 ENCOUNTER — APPOINTMENT (OUTPATIENT)
Dept: BEHAVIORAL HEALTH | Facility: CLINIC | Age: 30
End: 2025-03-06
Payer: COMMERCIAL

## 2025-03-06 DIAGNOSIS — F32.1 CURRENT MODERATE EPISODE OF MAJOR DEPRESSIVE DISORDER WITHOUT PRIOR EPISODE (MULTI): ICD-10-CM

## 2025-03-06 DIAGNOSIS — F41.1 GAD (GENERALIZED ANXIETY DISORDER): ICD-10-CM

## 2025-03-06 PROCEDURE — 90837 PSYTX W PT 60 MINUTES: CPT | Performed by: SOCIAL WORKER

## 2025-03-06 NOTE — PROGRESS NOTES
Televideo Informed Consent for psychological evaluation was reviewed with the patient as follows:     There are potential benefits and risks of the use of telephone or video-conferencing that differ from in-person sessions. Specifically, the telephone or televideo system we are using may not be HIPPA compliant and may present limits to patient confidentiality. Confidentiality still applies for telepsychology services, and nobody will record the session without your permission.    Understanding and verbal agreement was attested to by the patient. Patient identity was confirmed using 3 sources, including telephone number, email address and date of birth. Provision of services via telehealth was necessitated by the restrictions on face-to-face visits accompanying the COVID-19 pandemic.     SECURE NOTE:  This document may not be released or reproduced in any form without the consent of either the Provider, the Provider´s  or the Chair of the Department of Psychiatry. This prohibition includes copying the document into any non-Restricted area of the Ambulatory Electronic Medical Record.      Start time : 3:01 pm  End time : 4:00 pm    Diagnoses : ROBY     Clients current issues: He started his medication for his thyroid and he has been on it four days and he is feeling much better.  He is still struggling with sleep.     He is processing the struggle between himself and his girlfriend. He feels problems have been exposed in their relationship. He thinks they are growing in different paths. He suggested couples therapy. He is seeing his is unhappy with the relationship in some ways. He sees Lupe as defensive and unable to take accountability when she is wrong. Client feels neglected in this relationship.  He is thinking of staying in his current apartment for another year.     Treatment interventions: Supported more communication with Lupe. Supported client in identifying his own wants and needs in a  long term relationship.   He is using I feel language internally and with her.    Prognosis: good    Treatment plan update: Client is much less depressed. He is more clear minded with new perspectives.     Continue weekly ongoing psychotherapy.

## 2025-03-07 LAB
CRP SERPL-MCNC: <3 MG/L
GNRH SERPL-MCNC: <2 PG/ML (ref 0–10)
PROLACTIN SERPL-MCNC: 5.5 NG/ML (ref 2–18)
T3FREE SERPL-MCNC: 3.5 PG/ML (ref 2.3–4.2)
TESTOST FREE SERPL-MCNC: 105.8 PG/ML (ref 35–155)
TESTOST SERPL-MCNC: 598 NG/DL (ref 250–1100)
THYROGLOB AB SERPL-ACNC: >1000 IU/ML
THYROGLOB SERPL-MCNC: ABNORMAL NG/DL

## 2025-03-10 ENCOUNTER — OFFICE VISIT (OUTPATIENT)
Dept: ENDOCRINOLOGY | Facility: HOSPITAL | Age: 30
End: 2025-03-10
Payer: COMMERCIAL

## 2025-03-10 VITALS
WEIGHT: 189 LBS | DIASTOLIC BLOOD PRESSURE: 80 MMHG | SYSTOLIC BLOOD PRESSURE: 126 MMHG | HEIGHT: 73 IN | TEMPERATURE: 96.2 F | BODY MASS INDEX: 25.05 KG/M2 | HEART RATE: 56 BPM

## 2025-03-10 DIAGNOSIS — E06.3 HYPOTHYROIDISM DUE TO HASHIMOTO THYROIDITIS: Primary | ICD-10-CM

## 2025-03-10 LAB
CRP SERPL-MCNC: <3 MG/L
GNRH SERPL-MCNC: <2 PG/ML (ref 0–10)
PROLACTIN SERPL-MCNC: 5.5 NG/ML (ref 2–18)
T3FREE SERPL-MCNC: 3.5 PG/ML (ref 2.3–4.2)
TESTOST FREE SERPL-MCNC: 105.8 PG/ML (ref 35–155)
TESTOST SERPL-MCNC: 598 NG/DL (ref 250–1100)
THYROGLOB AB SERPL-ACNC: >1000 IU/ML
THYROGLOB SERPL-MCNC: <0.4 NG/ML

## 2025-03-10 PROCEDURE — 99214 OFFICE O/P EST MOD 30 MIN: CPT | Mod: GC | Performed by: STUDENT IN AN ORGANIZED HEALTH CARE EDUCATION/TRAINING PROGRAM

## 2025-03-10 PROCEDURE — 99204 OFFICE O/P NEW MOD 45 MIN: CPT | Performed by: STUDENT IN AN ORGANIZED HEALTH CARE EDUCATION/TRAINING PROGRAM

## 2025-03-10 PROCEDURE — 3008F BODY MASS INDEX DOCD: CPT | Performed by: STUDENT IN AN ORGANIZED HEALTH CARE EDUCATION/TRAINING PROGRAM

## 2025-03-10 ASSESSMENT — ENCOUNTER SYMPTOMS
OCCASIONAL FEELINGS OF UNSTEADINESS: 0
LOSS OF SENSATION IN FEET: 0
DEPRESSION: 1

## 2025-03-10 ASSESSMENT — PAIN SCALES - GENERAL: PAINLEVEL_OUTOF10: 0-NO PAIN

## 2025-03-10 NOTE — PATIENT INSTRUCTIONS
It was a pleasure to see you in clinic today! We discussed the following topics:  Labs: obtain after April 7th. We will follow them up at your next visit or discuss with you sooner as needed.  If you develop symptoms of hyperthyroidism such as weight loss, palpitations, feeling hot, or increased anxiety please let us know so we can adjust your levothyroxine.  Vitamin D: please start taking vitamin D 2000 IU per day.      Please follow-up with an office visit with us in 6 months.    Sincerely,    Donna Cardoso MD  Internal Medicine PGY-2  [facility]

## 2025-03-10 NOTE — PROGRESS NOTES
"  Endocrinology New Patient Visit    Chief complaint: abnormal thyroid labs    HPI:  Minh Mccord is a 29 y.o. male with past medical history of depression who presents to endocrinology for abnormal thyroid labs.    Patient saw his PCP on 1/27/25 to discuss potential thyroid issues. Starting in September, he noticed hair thinning on his scalp. He started losing weight around that time, stating he improved his diet and increased his exercise and was intending to lose 10 lbs, but easily lost 20 lbs. Around that time his mood worsened without any associated significant life changes or stressors, and has not improved since. Denied any palpitations, anxiety, neck tenderness, feeling hot at that time. No illnesses in late summer preceded onset of symptoms.    Endorses feeling tired and low energy much of the time. The past few months has had insomnia, wakes up frequently throughout the night and has difficulty going back to sleep. States he has felt more cold than usual this winter. Denies constipation, dry skin.     Labs were remarkable for TSH of 19.65, free T4 of 1.41, free T3 of 3.5, thyroglobulin antibody of >1000, TPO of 191, GnRH < 2, prolactin of 5.5, total testosterone of 598, and free testosterone of 105.8. His PCP started him on levothyroxine 50 mcg daily a week ago. Has been taking it on an empty stomach 30 mins before meals in the morning. Reports that his mood is noticeably better and he has been sleeping better since starting thyroid replacement.    Dad has hyperthyroidism, his aunt has Hashimotos. No other family history of autoimmunity that he is aware of. States that he had \"low immunity\" as a child and got infections frequently, but that it resolved by adolescence.    Medications:    Current Outpatient Medications:     levothyroxine (Synthroid, Levoxyl) 50 mcg tablet, Take 1 tablet (50 mcg) by mouth early in the morning.. Take on an empty stomach at the same time each day, either 30 to 60 minutes " prior to breakfast, Disp: 90 tablet, Rfl: 0  No other medications or supplements    Allergies:  No Known Allergies    Review of systems:  As per HPI    Vitals:  Vitals:    03/10/25 0855   BP: 126/80   Pulse: 56   Temp: 35.7 °C (96.2 °F)     Of note, HR was 88 in 10/2024    Physical exam:  Constitutional: Well-developed male in no acute distress.  HEENT: Normocephalic, atraumatic. No cervical lymphadenopathy. Thyromegaly.  Respiratory: CTA bilaterally. No wheezes, rales, or rhonchi. Normal respiratory effort.  Cardiovascular: RRR. No murmurs, gallops, or rubs. No JVD.  Neuro: Brisk brachial and patellar reflexes. [place] sign on L  Skin: Warm, dry. No rashes or wounds.  Psych: Appropriate mood and affect.    Labs:   Latest Reference Range & Units 01/23/25 14:34 02/24/25 10:07   T3, FREE 2.3 - 4.2 pg/mL  3.5   Thyroxine, Free 0.78 - 1.48 ng/dL 1.41    PROLACTIN 2.0 - 18.0 ng/mL  5.5   Thyroid Stimulating Hormone 0.44 - 3.98 mIU/L 19.65 (H)    GLUCOSE 74 - 99 mg/dL 80    TESTOSTERONE, TOTAL,  - 1,100 ng/dL  598   TESTOSTERONE, FREE 35.0 - 155.0 pg/mL  105.8   THYROGLOBULIN, LC/MS/MS   COMMENT ONLY   (H): Data is abnormally high    Assessment and Plan:  Minh Mccord is a 29 y.o. male who presents for evaluation of abnormal thyroid labs. Patient has highly elevated TSH and T4 and T3 on the upper end of normal, which would be unexpected for subclinical hypothyroidism. Patient has anti TPO and thyroglobulin antibodies consistent with a diagnosis of underlying Hashimoto thyroiditis. However, based on the history of weight loss, it may be possible that patient had an episode of thyroiditis in the fall with expected progression to hypothyroidism. Other evidence that supports this data is a recorded HR of 88 in 10/2024 and 56 today--HR of 88 in a young athletic male may have been relative tachycardia. Accordingly the labs in late January 2025 may have been indicative of rising thyroid function, thus explaining the  high-normal T4 and T3 with delay in decrease in TSH. Patient was started on levothyroxine with improvement in his symptoms thus will continue at this time, though it is difficult to know whether his clinical picture represents subclinical hypothyroidism vs resolving thyroiditis. He may not require lifelong levothyroxine therapy depending on his clinical course.    #Hashimoto thyroiditis  #Elevated TSH  #c/f prior episode of thyroiditis  Plan:  - Recheck TSH and T4 at 6 weeks following levothyroxine initiation (around 4/7/2025)  - recommend patient start taking Vitamin D 2000 IU daily d/t [place] sign on exam  - Check vit D in 6 weeks  - continue levothyroxine 50 mcg daily  - counseled patient on symptoms of hyperthyroidism and instructed him to notify us if they develop, as he may no longer require levothyroxine in the future    Follow-up in 6 mo with Dr. Santos.    Patient and plan discussed with attending physician, Dr. Santos.    Donna Cardoso MD  Internal Medicine PGY-2

## 2025-03-11 ENCOUNTER — APPOINTMENT (OUTPATIENT)
Dept: BEHAVIORAL HEALTH | Facility: CLINIC | Age: 30
End: 2025-03-11
Payer: COMMERCIAL

## 2025-03-11 DIAGNOSIS — F32.1 CURRENT MODERATE EPISODE OF MAJOR DEPRESSIVE DISORDER WITHOUT PRIOR EPISODE (MULTI): ICD-10-CM

## 2025-03-11 PROCEDURE — 90837 PSYTX W PT 60 MINUTES: CPT | Performed by: SOCIAL WORKER

## 2025-03-11 NOTE — PROGRESS NOTES
Televideo Informed Consent for psychological evaluation was reviewed with the patient as follows:     There are potential benefits and risks of the use of telephone or video-conferencing that differ from in-person sessions. Specifically, the telephone or televideo system we are using may not be HIPPA compliant and may present limits to patient confidentiality. Confidentiality still applies for telepsychology services, and nobody will record the session without your permission.    Understanding and verbal agreement was attested to by the patient. Patient identity was confirmed using 3 sources, including telephone number, email address and date of birth. Provision of services via telehealth was necessitated by the restrictions on face-to-face visits accompanying the COVID-19 pandemic.     SECURE NOTE:  This document may not be released or reproduced in any form without the consent of either the Provider, the Provider´s  or the Chair of the Department of Psychiatry. This prohibition includes copying the document into any non-Restricted area of the Ambulatory Electronic Medical Record.      Start time : 4:04 pm  End time : 5:00 pm    Diagnoses : ROBY    Clients current issues: Client saw an endocrinologist. She thinks he has Thyroiditis. ( swinging from hyper to hypo )  Client talked about experiencing the five stages of grief.   Currently he feels angry about his relationship situation. He feels neglected. ( betrayed that she prioritizes her job over him and the relationship )     Treatment interventions: Validated clients grief.   Discussed his and his girlfriends differing values.   Explored clients hurt feelings.     Prognosis: good    Treatment plan update: Client is invested in growth and therapy.      Continue weekly ongoing psychotherapy.

## 2025-03-20 ENCOUNTER — APPOINTMENT (OUTPATIENT)
Dept: BEHAVIORAL HEALTH | Facility: CLINIC | Age: 30
End: 2025-03-20
Payer: COMMERCIAL

## 2025-03-20 DIAGNOSIS — F41.1 GAD (GENERALIZED ANXIETY DISORDER): ICD-10-CM

## 2025-03-20 DIAGNOSIS — F32.1 CURRENT MODERATE EPISODE OF MAJOR DEPRESSIVE DISORDER WITHOUT PRIOR EPISODE (MULTI): ICD-10-CM

## 2025-03-20 PROCEDURE — 90837 PSYTX W PT 60 MINUTES: CPT | Performed by: SOCIAL WORKER

## 2025-03-20 NOTE — PROGRESS NOTES
Televideo Informed Consent for psychological evaluation was reviewed with the patient as follows:     There are potential benefits and risks of the use of telephone or video-conferencing that differ from in-person sessions. Specifically, the telephone or televideo system we are using may not be HIPPA compliant and may present limits to patient confidentiality. Confidentiality still applies for telepsychology services, and nobody will record the session without your permission.    Understanding and verbal agreement was attested to by the patient. Patient identity was confirmed using 3 sources, including telephone number, email address and date of birth. Provision of services via telehealth was necessitated by the restrictions on face-to-face visits accompanying the COVID-19 pandemic.     SECURE NOTE:  This document may not be released or reproduced in any form without the consent of either the Provider, the Provider´s  or the Chair of the Department of Psychiatry. This prohibition includes copying the document into any non-Restricted area of the Ambulatory Electronic Medical Record.      Start time : 3:02 pm  End time : 3:57 pm    Diagnoses : ROBY, MDD    Clients current issues: He is doing better on the thyroid medication. He says work is better. He is evaluating his relationship with Lupe.   They had a conversation about their future. He has been feeling invalidated, neglected and unfairly treated.     Treatment interventions: Supported clients mature process of evaluating this relationship. Gave feedback to support him in being transparent about his wants needs and values.     Prognosis: good    Treatment plan update: Client has grown a lot and is making good decisions.      Continue weekly ongoing psychotherapy.

## 2025-03-24 DIAGNOSIS — E06.3 HASHIMOTO'S DISEASE: ICD-10-CM

## 2025-03-25 RX ORDER — LEVOTHYROXINE SODIUM 50 UG/1
50 TABLET ORAL DAILY
Qty: 90 TABLET | Refills: 0 | Status: SHIPPED | OUTPATIENT
Start: 2025-03-25 | End: 2025-03-26 | Stop reason: SDUPTHER

## 2025-03-26 ENCOUNTER — APPOINTMENT (OUTPATIENT)
Dept: BEHAVIORAL HEALTH | Facility: CLINIC | Age: 30
End: 2025-03-26
Payer: COMMERCIAL

## 2025-03-26 DIAGNOSIS — F32.1 CURRENT MODERATE EPISODE OF MAJOR DEPRESSIVE DISORDER WITHOUT PRIOR EPISODE (MULTI): ICD-10-CM

## 2025-03-26 PROCEDURE — 90837 PSYTX W PT 60 MINUTES: CPT | Performed by: SOCIAL WORKER

## 2025-03-26 RX ORDER — LEVOTHYROXINE SODIUM 50 UG/1
50 TABLET ORAL DAILY
Qty: 14 TABLET | Refills: 3 | Status: SHIPPED | OUTPATIENT
Start: 2025-03-26 | End: 2025-05-21

## 2025-03-26 NOTE — PROGRESS NOTES
Televideo Informed Consent for psychological evaluation was reviewed with the patient as follows:     There are potential benefits and risks of the use of telephone or video-conferencing that differ from in-person sessions. Specifically, the telephone or televideo system we are using may not be HIPPA compliant and may present limits to patient confidentiality. Confidentiality still applies for telepsychology services, and nobody will record the session without your permission.    Understanding and verbal agreement was attested to by the patient. Patient identity was confirmed using 3 sources, including telephone number, email address and date of birth. Provision of services via telehealth was necessitated by the restrictions on face-to-face visits accompanying the COVID-19 pandemic.     SECURE NOTE:  This document may not be released or reproduced in any form without the consent of either the Provider, the Provider´s  or the Chair of the Department of Psychiatry. This prohibition includes copying the document into any non-Restricted area of the Ambulatory Electronic Medical Record.      Start time : 4:02 pm  End time : 5:01 pm    Diagnoses : MDD    Clients current issues: Client reports he is feeling much better on the thyroid medication.   He and Lupe talked again and she apologized. He reports this had a big impacted on him. She is prioritizing him and the relationship again. She understands she was neglected him. They are talking about her moving in again.     Treatment interventions: Supported client in trusting his gut. Supported wise mind. Discussed the pros and cons about her moving in.     Prognosis: good    Treatment plan update: Client is fully utilizing the therapy sessions.      Continue weekly ongoing psychotherapy.

## 2025-03-28 DIAGNOSIS — E06.3 HASHIMOTO'S DISEASE: ICD-10-CM

## 2025-04-03 ENCOUNTER — APPOINTMENT (OUTPATIENT)
Dept: BEHAVIORAL HEALTH | Facility: CLINIC | Age: 30
End: 2025-04-03
Payer: COMMERCIAL

## 2025-04-03 DIAGNOSIS — E06.3 HYPOTHYROIDISM DUE TO HASHIMOTO THYROIDITIS: ICD-10-CM

## 2025-04-07 DIAGNOSIS — E06.3 HYPOTHYROIDISM DUE TO HASHIMOTO THYROIDITIS: ICD-10-CM

## 2025-04-10 ENCOUNTER — APPOINTMENT (OUTPATIENT)
Dept: BEHAVIORAL HEALTH | Facility: CLINIC | Age: 30
End: 2025-04-10
Payer: COMMERCIAL

## 2025-04-10 DIAGNOSIS — F32.1 CURRENT MODERATE EPISODE OF MAJOR DEPRESSIVE DISORDER WITHOUT PRIOR EPISODE (MULTI): ICD-10-CM

## 2025-04-10 PROCEDURE — 90837 PSYTX W PT 60 MINUTES: CPT | Performed by: SOCIAL WORKER

## 2025-04-10 NOTE — PROGRESS NOTES
Televideo Informed Consent for psychological evaluation was reviewed with the patient as follows:     There are potential benefits and risks of the use of telephone or video-conferencing that differ from in-person sessions. Specifically, the telephone or televideo system we are using may not be HIPPA compliant and may present limits to patient confidentiality. Confidentiality still applies for telepsychology services, and nobody will record the session without your permission.    Understanding and verbal agreement was attested to by the patient. Patient identity was confirmed using 3 sources, including telephone number, email address and date of birth. Provision of services via telehealth was necessitated by the restrictions on face-to-face visits accompanying the COVID-19 pandemic.     SECURE NOTE:  This document may not be released or reproduced in any form without the consent of either the Provider, the Provider´s  or the Chair of the Department of Psychiatry. This prohibition includes copying the document into any non-Restricted area of the Ambulatory Electronic Medical Record.      Start time : 10:02 am  End time : 10:56 am    Diagnoses : MDD    Clients current issues: His hormone levels are stabilizing.   He reports he and Lupe are doing better.   He is cautiously moving forward in his relationship. He is aware that a part of him wants kids and Lupe does not.     Treatment interventions: Supported client in meeting his own internal needs.   Discussed ways he can mentor kids and have them be an important part of his life.     Prognosis: good    Treatment plan update: Client is doing well.      Continue weekly ongoing psychotherapy.

## 2025-04-11 LAB
25(OH)D3+25(OH)D2 SERPL-MCNC: 26 NG/ML (ref 30–100)
T4 FREE SERPL-MCNC: 1.5 NG/DL (ref 0.8–1.8)
TSH SERPL-ACNC: 6.93 MIU/L (ref 0.4–4.5)

## 2025-04-15 DIAGNOSIS — R94.6 ABNORMAL THYROID FUNCTION TEST: Primary | ICD-10-CM

## 2025-04-15 RX ORDER — LEVOTHYROXINE SODIUM 75 UG/1
75 TABLET ORAL DAILY
Qty: 90 TABLET | Refills: 0 | Status: SHIPPED | OUTPATIENT
Start: 2025-04-15 | End: 2025-07-14

## 2025-04-17 ENCOUNTER — APPOINTMENT (OUTPATIENT)
Age: 30
End: 2025-04-17
Payer: COMMERCIAL

## 2025-05-06 ENCOUNTER — APPOINTMENT (OUTPATIENT)
Dept: PRIMARY CARE | Facility: CLINIC | Age: 30
End: 2025-05-06
Payer: COMMERCIAL

## 2025-05-06 VITALS
BODY MASS INDEX: 25.58 KG/M2 | HEIGHT: 73 IN | HEART RATE: 60 BPM | DIASTOLIC BLOOD PRESSURE: 70 MMHG | OXYGEN SATURATION: 97 % | SYSTOLIC BLOOD PRESSURE: 118 MMHG | RESPIRATION RATE: 16 BRPM | WEIGHT: 193 LBS

## 2025-05-06 DIAGNOSIS — R94.6 ABNORMAL THYROID FUNCTION TEST: ICD-10-CM

## 2025-05-06 PROCEDURE — 1036F TOBACCO NON-USER: CPT | Performed by: INTERNAL MEDICINE

## 2025-05-06 PROCEDURE — 3008F BODY MASS INDEX DOCD: CPT | Performed by: INTERNAL MEDICINE

## 2025-05-06 PROCEDURE — 99213 OFFICE O/P EST LOW 20 MIN: CPT | Performed by: INTERNAL MEDICINE

## 2025-05-06 ASSESSMENT — ENCOUNTER SYMPTOMS
PALPITATIONS: 0
CHILLS: 0
ABDOMINAL PAIN: 0
TROUBLE SWALLOWING: 0
SHORTNESS OF BREATH: 0
FEVER: 0
SORE THROAT: 0

## 2025-05-06 ASSESSMENT — PATIENT HEALTH QUESTIONNAIRE - PHQ9
2. FEELING DOWN, DEPRESSED OR HOPELESS: NOT AT ALL
SUM OF ALL RESPONSES TO PHQ9 QUESTIONS 1 AND 2: 0
1. LITTLE INTEREST OR PLEASURE IN DOING THINGS: NOT AT ALL

## 2025-05-06 ASSESSMENT — PAIN SCALES - GENERAL: PAINLEVEL_OUTOF10: 0-NO PAIN

## 2025-05-06 NOTE — PROGRESS NOTES
"Subjective   Minh Mccord is a 30 y.o. male who presents for Follow-up (Test results ).      Mood changes have improved since starting medication.  Increase in levothyroxine last month and tolerating well .  Reviewed dose history.            Review of Systems   Constitutional:  Negative for chills and fever.   HENT:  Negative for sore throat and trouble swallowing.    Respiratory:  Negative for shortness of breath.    Cardiovascular:  Negative for chest pain, palpitations and leg swelling.   Gastrointestinal:  Negative for abdominal pain.   Skin:  Negative for rash.   All other systems reviewed and are negative.      Objective   /70   Pulse 60   Resp 16   Ht 1.854 m (6' 1\")   Wt 87.5 kg (193 lb)   SpO2 97%   BMI 25.46 kg/m²       Physical Exam  Constitutional:       Appearance: Normal appearance.   HENT:      Head: Normocephalic.   Eyes:      Conjunctiva/sclera: Conjunctivae normal.   Cardiovascular:      Rate and Rhythm: Normal rate and regular rhythm.      Heart sounds: Normal heart sounds.   Pulmonary:      Effort: Pulmonary effort is normal.      Breath sounds: Normal breath sounds.   Abdominal:      General: Abdomen is flat.   Musculoskeletal:      Cervical back: Neck supple.   Skin:     General: Skin is warm and dry.   Neurological:      Mental Status: He is alert.         Assessment & Plan  Abnormal thyroid function test    Orders:    TSH with reflex to Free T4 if abnormal; Future       Assessment & Plan        Brodie Lawton DO   This medical note was created with the assistance of artificial intelligence (AI) for documentation purposes. The content has been reviewed and confirmed by the healthcare provider for accuracy and completeness. Patient consented to the use of audio recording and use of AI during their visit.   "

## 2025-05-07 DIAGNOSIS — R94.6 ABNORMAL THYROID FUNCTION TEST: ICD-10-CM

## 2025-05-07 RX ORDER — LEVOTHYROXINE SODIUM 75 UG/1
75 TABLET ORAL DAILY
Qty: 90 TABLET | Refills: 3 | Status: SHIPPED | OUTPATIENT
Start: 2025-05-07 | End: 2026-05-02

## 2025-05-14 ENCOUNTER — SOCIAL WORK (OUTPATIENT)
Dept: BEHAVIORAL HEALTH | Facility: CLINIC | Age: 30
End: 2025-05-14
Payer: COMMERCIAL

## 2025-05-14 DIAGNOSIS — F32.1 CURRENT MODERATE EPISODE OF MAJOR DEPRESSIVE DISORDER WITHOUT PRIOR EPISODE (MULTI): ICD-10-CM

## 2025-05-14 DIAGNOSIS — F41.1 GAD (GENERALIZED ANXIETY DISORDER): ICD-10-CM

## 2025-05-14 PROCEDURE — 90837 PSYTX W PT 60 MINUTES: CPT | Performed by: SOCIAL WORKER

## 2025-05-14 NOTE — PROGRESS NOTES
An interactive audio and video telecommunication system which permits real time communications between the patient (at the originating site) and provider (at the distant site) was utilized to provide this telehealth service.   Verbal consent was requested and obtained from this client on this date, for a telehealth visit and the patient's location was confirmed at the time of the visit.        Start time : 10:03 am  End time : 11:00 am    Diagnoses : ROBY    Clients current issues: His girlfriend is not happy. She doesn't want to move in to [facility]. He feels some guilt.     Treatment interventions:   Explored clients guilty part.   Explored parts that are angry and fearful.  He fears Lupe isn't mature enough to make the next steps in the relationships.     Prognosis: good    Treatment plan update: Client is struggling in his relationship.     Continue weekly ongoing psychotherapy.

## 2025-05-19 PROBLEM — M76.30 ILIOTIBIAL BAND SYNDROME: Status: ACTIVE | Noted: 2025-05-19

## 2025-05-19 PROBLEM — K92.1 MELENA: Status: ACTIVE | Noted: 2017-08-15

## 2025-05-21 ENCOUNTER — APPOINTMENT (OUTPATIENT)
Dept: BEHAVIORAL HEALTH | Facility: CLINIC | Age: 30
End: 2025-05-21
Payer: COMMERCIAL

## 2025-05-21 DIAGNOSIS — F41.1 GAD (GENERALIZED ANXIETY DISORDER): ICD-10-CM

## 2025-05-21 DIAGNOSIS — F32.1 CURRENT MODERATE EPISODE OF MAJOR DEPRESSIVE DISORDER WITHOUT PRIOR EPISODE (MULTI): ICD-10-CM

## 2025-05-21 PROCEDURE — 90837 PSYTX W PT 60 MINUTES: CPT | Performed by: SOCIAL WORKER

## 2025-05-21 NOTE — PROGRESS NOTES
An interactive audio and video telecommunication system which permits real time communications between the patient (at the originating site) and provider (at the distant site) was utilized to provide this telehealth service.   Verbal consent was requested and obtained from this client on this date, for a telehealth visit and the patient's location was confirmed at the time of the visit.        Start time : 10:04 am  End time : 11:00 am    Diagnoses : ROBY    Clients current issues: Client asked Lupe to make the choice she wanted and not complain about it. His anxiety has decreased a lot.   He says he has a part that is mourning the life he wanted ( children and living in [place] )     Treatment interventions: Supported clients growth.   Explored this sad/disappointed part  Helped client to engage in his   Supported him in allowing himself to want what he wants.     Prognosis: good    Treatment plan update: Client feels relieved and hopeful.      Continue weekly ongoing psychotherapy.

## 2025-06-04 ENCOUNTER — APPOINTMENT (OUTPATIENT)
Dept: BEHAVIORAL HEALTH | Facility: CLINIC | Age: 30
End: 2025-06-04
Payer: COMMERCIAL

## 2025-06-04 DIAGNOSIS — F41.1 GAD (GENERALIZED ANXIETY DISORDER): ICD-10-CM

## 2025-06-04 DIAGNOSIS — F32.1 CURRENT MODERATE EPISODE OF MAJOR DEPRESSIVE DISORDER WITHOUT PRIOR EPISODE (MULTI): ICD-10-CM

## 2025-06-04 PROCEDURE — 90837 PSYTX W PT 60 MINUTES: CPT | Performed by: SOCIAL WORKER

## 2025-06-04 NOTE — PROGRESS NOTES
An interactive audio and video telecommunication system which permits real time communications between the patient (at the originating site) and provider (at the distant site) was utilized to provide this telehealth service.   Verbal consent was requested and obtained from this client on this date, for a telehealth visit and the patient's location was confirmed at the time of the visit.        Start time : 3:04 pm  End time : 3:57 pm    Diagnoses : ROBY, MDD    Clients current issues: Lupe is signing the lease today. Lupe has been insecure about their relationship.   Client is thinking about exploring other jobs this summer. He really wants to work 8-4.    Treatment interventions: Provided support and validation.     Prognosis:  good    Treatment plan update: Client is stable and doing well.      Continue weekly ongoing psychotherapy.

## 2025-07-10 ENCOUNTER — APPOINTMENT (OUTPATIENT)
Dept: BEHAVIORAL HEALTH | Facility: CLINIC | Age: 30
End: 2025-07-10
Payer: COMMERCIAL

## 2025-07-10 DIAGNOSIS — F32.1 CURRENT MODERATE EPISODE OF MAJOR DEPRESSIVE DISORDER WITHOUT PRIOR EPISODE (MULTI): ICD-10-CM

## 2025-07-10 DIAGNOSIS — F41.1 GAD (GENERALIZED ANXIETY DISORDER): ICD-10-CM

## 2025-07-10 PROCEDURE — 90837 PSYTX W PT 60 MINUTES: CPT | Performed by: SOCIAL WORKER

## 2025-07-10 NOTE — PROGRESS NOTES
"   An interactive audio and video telecommunication system which permits real time communications between the patient (at the originating site) and provider (at the distant site) was utilized to provide this telehealth service.   Verbal consent was requested and obtained from this client on this date, for a telehealth visit and the patient's location was confirmed at the time of the visit.        Start time : 10:02 am  End time : 11:00 am    Diagnoses : ROBY, MDD    Clients current issues: Lupe has moved in. They are adjusting.     His Uncle is dying from multiple myeloma. Client has had a family member die each year for the lat five years.   This has caused client to think about his own death. He feels a growing pressure to step up and find more purpose in life.   \" I am on a journey of finding myself.\"   What do I want to do? There is a fork in the road and he doesn't know where to go.   Family and friends are his priority.  He is feeling called to be in or do more in [place] again.     Treatment interventions:  Supported him in exploring options to bring him more of a sense of purpose.   Explored how his drive to return to [place] might impact him.     Prognosis: good    Treatment plan update: Client is growing in the therapy process.      Continue weekly ongoing psychotherapy.   "

## 2025-07-16 DIAGNOSIS — R94.6 ABNORMAL THYROID FUNCTION TEST: ICD-10-CM

## 2025-08-09 LAB
T4 FREE SERPL-MCNC: 1.4 NG/DL (ref 0.8–1.8)
TSH SERPL-ACNC: 24.17 MIU/L (ref 0.4–4.5)

## 2025-08-11 ENCOUNTER — OFFICE VISIT (OUTPATIENT)
Dept: URGENT CARE | Age: 30
End: 2025-08-11
Payer: COMMERCIAL

## 2025-08-11 VITALS
BODY MASS INDEX: 24.52 KG/M2 | RESPIRATION RATE: 19 BRPM | DIASTOLIC BLOOD PRESSURE: 74 MMHG | WEIGHT: 185 LBS | HEIGHT: 73 IN | SYSTOLIC BLOOD PRESSURE: 125 MMHG | HEART RATE: 60 BPM | TEMPERATURE: 98 F | OXYGEN SATURATION: 100 %

## 2025-08-11 DIAGNOSIS — J02.9 PHARYNGITIS, UNSPECIFIED ETIOLOGY: Primary | ICD-10-CM

## 2025-08-11 DIAGNOSIS — R94.6 ABNORMAL THYROID FUNCTION TEST: ICD-10-CM

## 2025-08-11 RX ORDER — LEVOTHYROXINE SODIUM 125 UG/1
125 TABLET ORAL DAILY
Qty: 90 TABLET | Refills: 3 | Status: SHIPPED | OUTPATIENT
Start: 2025-08-11 | End: 2025-08-11

## 2025-08-11 RX ORDER — LEVOTHYROXINE SODIUM 100 UG/1
100 TABLET ORAL DAILY
Qty: 90 TABLET | Refills: 3 | Status: SHIPPED | OUTPATIENT
Start: 2025-08-11 | End: 2026-08-06

## 2025-08-11 RX ORDER — PREDNISONE 20 MG/1
20 TABLET ORAL 2 TIMES DAILY
Qty: 20 TABLET | Refills: 0 | Status: SHIPPED | OUTPATIENT
Start: 2025-08-11 | End: 2025-08-16

## 2025-08-11 ASSESSMENT — PATIENT HEALTH QUESTIONNAIRE - PHQ9
1. LITTLE INTEREST OR PLEASURE IN DOING THINGS: NOT AT ALL
SUM OF ALL RESPONSES TO PHQ9 QUESTIONS 1 AND 2: 0
2. FEELING DOWN, DEPRESSED OR HOPELESS: NOT AT ALL

## 2025-09-18 ENCOUNTER — APPOINTMENT (OUTPATIENT)
Facility: CLINIC | Age: 30
End: 2025-09-18
Payer: COMMERCIAL

## 2025-10-17 ENCOUNTER — APPOINTMENT (OUTPATIENT)
Dept: ENDOCRINOLOGY | Facility: CLINIC | Age: 30
End: 2025-10-17
Payer: COMMERCIAL

## 2025-11-06 ENCOUNTER — APPOINTMENT (OUTPATIENT)
Dept: PRIMARY CARE | Facility: CLINIC | Age: 30
End: 2025-11-06
Payer: COMMERCIAL